# Patient Record
Sex: MALE | Race: WHITE | HISPANIC OR LATINO | Employment: OTHER | URBAN - METROPOLITAN AREA
[De-identification: names, ages, dates, MRNs, and addresses within clinical notes are randomized per-mention and may not be internally consistent; named-entity substitution may affect disease eponyms.]

---

## 2024-07-16 ENCOUNTER — DOCUMENTATION ONLY (OUTPATIENT)
Dept: HEMATOLOGY/ONCOLOGY | Facility: CLINIC | Age: 74
End: 2024-07-16
Payer: MEDICARE

## 2024-07-16 ENCOUNTER — TELEPHONE (OUTPATIENT)
Dept: HEMATOLOGY/ONCOLOGY | Facility: CLINIC | Age: 74
End: 2024-07-16
Payer: MEDICARE

## 2024-07-16 NOTE — NURSING
JERARDO for pt reviewing his upcoming 8/8/24 appointment with Dr Barclay.  Provided my direct contact information and encouraged him to return my call.

## 2024-07-18 ENCOUNTER — DOCUMENTATION ONLY (OUTPATIENT)
Dept: HEMATOLOGY/ONCOLOGY | Facility: CLINIC | Age: 74
End: 2024-07-18
Payer: MEDICARE

## 2024-07-18 DIAGNOSIS — D3A.8 NEUROENDOCRINE TUMOR: Primary | ICD-10-CM

## 2024-07-18 NOTE — PROGRESS NOTES
SHAHRAMM for pt to return my call to discuss upcoming appt for labs and PET needed before he sees Dr Barclay

## 2024-08-01 ENCOUNTER — HOSPITAL ENCOUNTER (OUTPATIENT)
Dept: RADIOLOGY | Facility: HOSPITAL | Age: 74
Discharge: HOME | End: 2024-08-01
Attending: INTERNAL MEDICINE
Payer: OTHER GOVERNMENT

## 2024-08-01 DIAGNOSIS — D3A.8 NEUROENDOCRINE TUMOR: ICD-10-CM

## 2024-08-01 PROCEDURE — 78815 PET IMAGE W/CT SKULL-THIGH: CPT | Mod: TC

## 2024-08-01 PROCEDURE — 78815 PET IMAGE W/CT SKULL-THIGH: CPT | Mod: 26,PI,, | Performed by: STUDENT IN AN ORGANIZED HEALTH CARE EDUCATION/TRAINING PROGRAM

## 2024-08-01 PROCEDURE — A9592 HC COPPER CU-64, DOTATE, DX, PER 1 MCI: HCPCS | Performed by: INTERNAL MEDICINE

## 2024-08-01 RX ADMIN — COPPER CU 64 DOTATATE 4.08 MILLICURIE: 1 INJECTION, SOLUTION INTRAVENOUS at 12:08

## 2024-08-07 ENCOUNTER — OFFICE VISIT (OUTPATIENT)
Dept: HEMATOLOGY/ONCOLOGY | Facility: CLINIC | Age: 74
End: 2024-08-07
Payer: OTHER GOVERNMENT

## 2024-08-07 VITALS
BODY MASS INDEX: 28.41 KG/M2 | TEMPERATURE: 98 F | SYSTOLIC BLOOD PRESSURE: 132 MMHG | HEART RATE: 64 BPM | WEIGHT: 202.94 LBS | OXYGEN SATURATION: 96 % | DIASTOLIC BLOOD PRESSURE: 77 MMHG | HEIGHT: 71 IN

## 2024-08-07 DIAGNOSIS — C7A.8 PRIMARY MALIGNANT NEUROENDOCRINE TUMOR OF STOMACH: Primary | ICD-10-CM

## 2024-08-07 DIAGNOSIS — K29.40 ATROPHIC GASTRITIS WITHOUT HEMORRHAGE: ICD-10-CM

## 2024-08-07 DIAGNOSIS — D51.0 PERNICIOUS ANEMIA: ICD-10-CM

## 2024-08-07 PROCEDURE — 99205 OFFICE O/P NEW HI 60 MIN: CPT | Mod: S$PBB,,, | Performed by: INTERNAL MEDICINE

## 2024-08-07 PROCEDURE — G2211 COMPLEX E/M VISIT ADD ON: HCPCS | Mod: S$PBB,,, | Performed by: INTERNAL MEDICINE

## 2024-08-07 PROCEDURE — 99999 PR PBB SHADOW E&M-EST. PATIENT-LVL III: CPT | Mod: PBBFAC,,, | Performed by: INTERNAL MEDICINE

## 2024-08-07 PROCEDURE — 99213 OFFICE O/P EST LOW 20 MIN: CPT | Mod: PBBFAC | Performed by: INTERNAL MEDICINE

## 2024-08-29 DIAGNOSIS — C7A.8 PRIMARY MALIGNANT NEUROENDOCRINE TUMOR OF STOMACH: Primary | ICD-10-CM

## 2024-10-22 ENCOUNTER — TELEPHONE (OUTPATIENT)
Dept: ENDOSCOPY | Facility: HOSPITAL | Age: 74
End: 2024-10-22
Payer: OTHER GOVERNMENT

## 2024-10-22 DIAGNOSIS — C7A.092 MALIGNANT CARCINOID TUMOR OF THE STOMACH: Primary | ICD-10-CM

## 2024-10-22 NOTE — TELEPHONE ENCOUNTER
Called pt to schedule EGD procedure with no answer. Voicemail left with direct phone number for return call to schedule.

## 2024-10-25 ENCOUNTER — TELEPHONE (OUTPATIENT)
Dept: ENDOSCOPY | Facility: HOSPITAL | Age: 74
End: 2024-10-25
Payer: OTHER GOVERNMENT

## 2025-01-02 ENCOUNTER — TELEPHONE (OUTPATIENT)
Dept: HEMATOLOGY/ONCOLOGY | Facility: CLINIC | Age: 75
End: 2025-01-02
Payer: OTHER GOVERNMENT

## 2025-01-02 NOTE — TELEPHONE ENCOUNTER
I called the patient back to let him know that Dr Barclay stated that he can get his lab for Vitiman B-12 at [facility]. I sent him a email link to set his portal up. And he stated he will get it there and have them fax over the results.

## 2025-01-15 ENCOUNTER — TELEPHONE (OUTPATIENT)
Dept: HEMATOLOGY/ONCOLOGY | Facility: CLINIC | Age: 75
End: 2025-01-15
Payer: OTHER GOVERNMENT

## 2025-01-15 NOTE — TELEPHONE ENCOUNTER
I attempt to reach pt  to schedule him a follow apt with the provider,but unfortunately I was  unable to reach patient   so i left both a voice mail and call back number.

## 2025-06-09 ENCOUNTER — TELEPHONE (OUTPATIENT)
Dept: HEMATOLOGY/ONCOLOGY | Facility: CLINIC | Age: 75
End: 2025-06-09
Payer: OTHER GOVERNMENT

## 2025-06-09 NOTE — TELEPHONE ENCOUNTER
----- Message from Alice sent at 6/9/2025 11:31 AM CDT -----  Type: General Call Back Name of Caller:ptWould the patient rather a call back or a response via [facility]? callSYSTRAN Call Back Number:[phone] Additional Information: Pt would like a call back regarding his 06/24 appt. Pt wants to be seen around 11 am to have fasting labs done and move up Dr. Barclay's appt up in time.

## 2025-06-09 NOTE — TELEPHONE ENCOUNTER
Called patient three times--RN received voicemail each time. Left detailed message requesting call back.    6/24 appointment needs to be rescheduled as fasting labs must be completed three week prior to seeing Dr. Barclay. Next available is 7/8 at 10:40am --if patient prefers time this and date.

## 2025-06-10 ENCOUNTER — PATIENT MESSAGE (OUTPATIENT)
Dept: HEMATOLOGY/ONCOLOGY | Facility: CLINIC | Age: 75
End: 2025-06-10
Payer: OTHER GOVERNMENT

## 2025-06-10 ENCOUNTER — PATIENT MESSAGE (OUTPATIENT)
Dept: GASTROENTEROLOGY | Facility: CLINIC | Age: 75
End: 2025-06-10
Payer: OTHER GOVERNMENT

## 2025-06-11 ENCOUNTER — TELEPHONE (OUTPATIENT)
Dept: HEMATOLOGY/ONCOLOGY | Facility: CLINIC | Age: 75
End: 2025-06-11
Payer: OTHER GOVERNMENT

## 2025-06-11 NOTE — TELEPHONE ENCOUNTER
Returned call to patient to inform him Dr. Barclay cannot order labs, EGD and CT for patient at [facility].  Must have everything ordered and done at [facility] or RFS completed for patient to have everything done at [facility].  Patient stated he already completed lab work in February.  Informed patient only 2 out of the 5 labs wanted by MD prior to visit were completed and those labs are sent out.  Take about 3 weeks to result and would not be ready for patient appointment scheduled on 6/24.  Patient verbalized understanding.  Will complete an RFS for patient to have everything done at [facility].  Will reach out to patient if needed and with update when RN has one.  Patient verbalized understanding.

## 2025-06-11 NOTE — TELEPHONE ENCOUNTER
----- Message from Sukhjinder sent at 6/11/2025 11:19 AM CDT -----  Type:  Patient Returning CallWho Called:ptWho Left Message for Patient:Does the patient know what this is regarding?:labsWould the patient rather a call back or a response via [facility]? callBest Call Back Number: [phone] Information: pt states his labs have already been done and for office to receive them, office just needs to call over to [facility]. Pt would like a call back from office. Thank you

## 2025-06-27 DIAGNOSIS — C7A.8 OTHER MALIGNANT NEUROENDOCRINE TUMORS: Primary | ICD-10-CM

## 2025-06-30 DIAGNOSIS — C7A.8 OTHER MALIGNANT NEUROENDOCRINE TUMORS: Primary | ICD-10-CM

## 2025-07-01 ENCOUNTER — PATIENT MESSAGE (OUTPATIENT)
Dept: GASTROENTEROLOGY | Facility: CLINIC | Age: 75
End: 2025-07-01
Payer: OTHER GOVERNMENT

## 2025-07-01 ENCOUNTER — TELEPHONE (OUTPATIENT)
Dept: GASTROENTEROLOGY | Facility: CLINIC | Age: 75
End: 2025-07-01
Payer: OTHER GOVERNMENT

## 2025-07-01 NOTE — TELEPHONE ENCOUNTER
Copied from CRM #[id number]. Topic: Appointments - Appointment Access  >> Jul 1, 2025 12:07 PM Eli wrote:  Pt is calling to get endoscopy schedule, asking for call back

## 2025-07-08 ENCOUNTER — TELEPHONE (OUTPATIENT)
Dept: ENDOSCOPY | Facility: HOSPITAL | Age: 75
End: 2025-07-08
Payer: OTHER GOVERNMENT

## 2025-07-08 NOTE — TELEPHONE ENCOUNTER
Copied from CRM #[id number]. Topic: Appointments - Amb Referral  >> Jul 7, 2025  1:08 PM Monica wrote:  Shondel/caregiver calling to speak to Jeanette regarding referral. [phone] Pls call

## 2025-07-14 ENCOUNTER — TELEPHONE (OUTPATIENT)
Dept: GASTROENTEROLOGY | Facility: CLINIC | Age: 75
End: 2025-07-14
Payer: OTHER GOVERNMENT

## 2025-07-14 DIAGNOSIS — D3A.00 CARCINOID TUMOR, UNSPECIFIED SITE, UNSPECIFIED WHETHER MALIGNANT: Primary | ICD-10-CM

## 2025-08-13 ENCOUNTER — TELEPHONE (OUTPATIENT)
Dept: GASTROENTEROLOGY | Facility: CLINIC | Age: 75
End: 2025-08-13
Payer: OTHER GOVERNMENT

## 2025-08-14 ENCOUNTER — TELEPHONE (OUTPATIENT)
Dept: ENDOSCOPY | Facility: HOSPITAL | Age: 75
End: 2025-08-14
Payer: OTHER GOVERNMENT

## 2025-08-18 ENCOUNTER — TELEPHONE (OUTPATIENT)
Dept: ENDOSCOPY | Facility: HOSPITAL | Age: 75
End: 2025-08-18
Payer: OTHER GOVERNMENT

## 2025-08-22 ENCOUNTER — TELEPHONE (OUTPATIENT)
Dept: GASTROENTEROLOGY | Facility: CLINIC | Age: 75
End: 2025-08-22
Payer: OTHER GOVERNMENT

## 2025-08-22 DIAGNOSIS — D3A.00 CARCINOID TUMOR, UNSPECIFIED SITE, UNSPECIFIED WHETHER MALIGNANT: Primary | ICD-10-CM
